# Patient Record
Sex: FEMALE | Race: WHITE | NOT HISPANIC OR LATINO | Employment: UNEMPLOYED | URBAN - METROPOLITAN AREA
[De-identification: names, ages, dates, MRNs, and addresses within clinical notes are randomized per-mention and may not be internally consistent; named-entity substitution may affect disease eponyms.]

---

## 2019-08-12 ENCOUNTER — OFFICE VISIT (OUTPATIENT)
Dept: ORTHOPEDICS | Facility: CLINIC | Age: 64
End: 2019-08-12
Payer: MEDICAID

## 2019-08-12 VITALS — WEIGHT: 173.88 LBS | BODY MASS INDEX: 30.81 KG/M2 | HEIGHT: 63 IN | TEMPERATURE: 98 F

## 2019-08-12 DIAGNOSIS — M17.0 PRIMARY OSTEOARTHRITIS OF BOTH KNEES: Primary | ICD-10-CM

## 2019-08-12 PROCEDURE — 99201 PR OFFICE/OUTPT VISIT,NEW,LEVL I: ICD-10-PCS | Mod: 25,S$GLB,, | Performed by: ORTHOPAEDIC SURGERY

## 2019-08-12 PROCEDURE — 99201 PR OFFICE/OUTPT VISIT,NEW,LEVL I: CPT | Mod: 25,S$GLB,, | Performed by: ORTHOPAEDIC SURGERY

## 2019-08-12 PROCEDURE — 20610 LARGE JOINT ASPIRATION/INJECTION: R KNEE, L KNEE: ICD-10-PCS | Mod: 50,S$GLB,, | Performed by: ORTHOPAEDIC SURGERY

## 2019-08-12 PROCEDURE — 20610 DRAIN/INJ JOINT/BURSA W/O US: CPT | Mod: 50,S$GLB,, | Performed by: ORTHOPAEDIC SURGERY

## 2019-08-12 RX ORDER — ATENOLOL 25 MG/1
12.5 TABLET ORAL 2 TIMES DAILY
Refills: 0 | COMMUNITY
Start: 2019-07-11 | End: 2021-01-14

## 2019-08-12 RX ORDER — FLUTICASONE PROPIONATE 50 MCG
SPRAY, SUSPENSION (ML) NASAL
Refills: 3 | COMMUNITY
Start: 2019-07-11 | End: 2021-10-07

## 2019-08-12 RX ORDER — ALBUTEROL SULFATE 90 UG/1
AEROSOL, METERED RESPIRATORY (INHALATION)
Refills: 1 | COMMUNITY
Start: 2019-07-11 | End: 2019-11-19 | Stop reason: CLARIF

## 2019-08-12 RX ORDER — BUPROPION HYDROCHLORIDE 150 MG/1
150 TABLET, EXTENDED RELEASE ORAL
COMMUNITY
Start: 2016-02-22 | End: 2019-11-19 | Stop reason: CLARIF

## 2019-08-12 RX ORDER — DICLOFENAC SODIUM 75 MG/1
TABLET, DELAYED RELEASE ORAL
Refills: 0 | Status: ON HOLD | COMMUNITY
Start: 2019-08-01 | End: 2019-12-05 | Stop reason: HOSPADM

## 2019-08-12 RX ORDER — TRIAMCINOLONE ACETONIDE 40 MG/ML
40 INJECTION, SUSPENSION INTRA-ARTICULAR; INTRAMUSCULAR
Status: DISCONTINUED | OUTPATIENT
Start: 2019-08-12 | End: 2019-08-12 | Stop reason: HOSPADM

## 2019-08-12 RX ORDER — TRAMADOL HYDROCHLORIDE 50 MG/1
TABLET ORAL
Refills: 0 | Status: ON HOLD | COMMUNITY
Start: 2019-06-16 | End: 2019-12-05 | Stop reason: HOSPADM

## 2019-08-12 RX ORDER — MONTELUKAST SODIUM 10 MG/1
10 TABLET ORAL DAILY
Refills: 3 | COMMUNITY
Start: 2019-07-11 | End: 2021-01-14

## 2019-08-12 RX ORDER — FENOFIBRATE 134 MG/1
CAPSULE ORAL
Refills: 1 | COMMUNITY
Start: 2019-07-19 | End: 2021-10-07

## 2019-08-12 RX ORDER — PREDNISONE 20 MG/1
TABLET ORAL
Refills: 0 | Status: ON HOLD | COMMUNITY
Start: 2019-06-16 | End: 2019-12-05 | Stop reason: HOSPADM

## 2019-08-12 RX ORDER — MELOXICAM 7.5 MG/1
TABLET ORAL
Refills: 0 | Status: ON HOLD | COMMUNITY
Start: 2019-08-02 | End: 2019-12-05 | Stop reason: HOSPADM

## 2019-08-12 RX ORDER — OMEPRAZOLE 20 MG/1
CAPSULE, DELAYED RELEASE ORAL
Refills: 4 | COMMUNITY
Start: 2019-07-19 | End: 2021-10-07

## 2019-08-12 RX ORDER — GABAPENTIN 100 MG/1
100 CAPSULE ORAL NIGHTLY
Refills: 1 | Status: ON HOLD | COMMUNITY
Start: 2019-07-11 | End: 2019-12-05 | Stop reason: HOSPADM

## 2019-08-12 RX ORDER — CETIRIZINE HYDROCHLORIDE 10 MG/1
TABLET ORAL
Refills: 5 | COMMUNITY
Start: 2019-07-19 | End: 2021-01-14

## 2019-08-12 RX ORDER — NABUMETONE 750 MG/1
TABLET, FILM COATED ORAL
Refills: 0 | Status: ON HOLD | COMMUNITY
Start: 2019-05-26 | End: 2019-12-05 | Stop reason: HOSPADM

## 2019-08-12 RX ORDER — AZELASTINE HCL 205.5 UG/1
1 SPRAY NASAL
COMMUNITY
Start: 2018-01-02 | End: 2019-11-19 | Stop reason: CLARIF

## 2019-08-12 RX ORDER — HYDROCHLOROTHIAZIDE 25 MG/1
TABLET ORAL
Refills: 3 | COMMUNITY
Start: 2019-07-11 | End: 2021-01-14

## 2019-08-12 RX ORDER — BUSPIRONE HYDROCHLORIDE 15 MG/1
TABLET ORAL
Refills: 6 | COMMUNITY
Start: 2019-07-11 | End: 2019-11-19 | Stop reason: CLARIF

## 2019-08-12 RX ORDER — LEVOTHYROXINE SODIUM 50 UG/1
50 TABLET ORAL DAILY
Refills: 6 | COMMUNITY
Start: 2019-07-11 | End: 2021-10-07

## 2019-08-12 RX ORDER — HYDROCODONE BITARTRATE AND ACETAMINOPHEN 5; 325 MG/1; MG/1
TABLET ORAL
Refills: 0 | Status: ON HOLD | COMMUNITY
Start: 2019-08-02 | End: 2019-12-05 | Stop reason: HOSPADM

## 2019-08-12 RX ADMIN — TRIAMCINOLONE ACETONIDE 40 MG: 40 INJECTION, SUSPENSION INTRA-ARTICULAR; INTRAMUSCULAR at 01:08

## 2019-08-12 NOTE — PROCEDURES
Large Joint Aspiration/Injection: R knee, L knee  Date/Time: 8/12/2019 1:33 PM  Performed by: Nimesh Bello MD  Authorized by: Nimesh Bello MD     Consent Done?:  Yes (Verbal)  Indications:  Pain  Timeout: Prior to procedure the correct patient, procedure, and site was verified    Anesthesia  Local anesthesia used  Anesthetic: lidocaine 2% without epinephrine  Anesthetic total: 4mL    Location:  Knee  Site:  R knee and L knee  Prep: Patient was prepped and draped in usual sterile fashion    Needle size:  25 G  Approach:  Anteromedial  Medications:  40 mg triamcinolone acetonide 40 mg/mL; 40 mg triamcinolone acetonide 40 mg/mL  Patient tolerance:  Patient tolerated the procedure well with no immediate complications

## 2019-08-12 NOTE — PROGRESS NOTES
Subjective:      Patient ID: Lila Antonio is a 63 y.o. female.    Chief Complaint: Knee Pain (LT)    HPI 63-year-old lady with a many year history of bilateral knee pain worse on the left than the right.  She recently moved from [place] to [place].  She was getting injections in [place] with some relief.  She is presently taking diclofenac for her arthritis.    Review of Systems   Constitution: Negative for fever and weight loss.   Cardiovascular: Negative for chest pain and leg swelling.   Musculoskeletal: Positive for arthritis, joint pain, joint swelling and stiffness. Negative for muscle weakness.   Gastrointestinal: Negative for change in bowel habit.   Genitourinary: Negative for bladder incontinence and hematuria.   Neurological: Negative for focal weakness, numbness, paresthesias and sensory change.         Objective:                      Right Knee Exam     Inspection   Deformity: present    Tenderness   The patient is tender to palpation of the medial joint line, condyle and lateral joint line.    Crepitus   The patient has crepitus of the patella.    Range of Motion   Extension: normal   Flexion: 120     Tests   Meniscus   Dax:  Medial - negative   Ligament Examination   MCL - Valgus: normal (0 to 2mm)  LCL - Varus: normal  Patella   Patellar Tracking: normal    Comments:  Patient has significant varus alignment on standing    Left Knee Exam     Inspection   Deformity: present    Tenderness   The patient tender to palpation of the medial joint line, condyle and lateral joint line.    Crepitus   The patient has crepitus of the patella.    Range of Motion   Extension: normal   Flexion: 120     Tests   Meniscus   Dax:  Medial - negative   Stability Lachman: normal (-1 to 2mm)   MCL - Valgus: normal (0 to 2mm)  LCL - Varus: normal (0 to 2mm)  Patella   Patellar Tracking: normal    Comments:  Significant varus alignment on standing              Assessment:       Encounter Diagnosis   Name Primary?     Primary osteoarthritis of both knees Yes          Plan:       Lila was seen today for knee pain.    Diagnoses and all orders for this visit:    Primary osteoarthritis of both knees     Outside x-rays are reviewed and they show severe tricompartmental disease with complete loss of the medial articular cartilage space with bone-on-bone deformity.  The medial femoral condyle is eroding into the medial tibial plateau worse on the left than the right.  She also has severe patellofemoral disease  She really needs total knee arthroplasties although she is unable to do that at this time.  Both knees are injected today. Return p.r.n.

## 2019-08-29 ENCOUNTER — OFFICE VISIT (OUTPATIENT)
Dept: ORTHOPEDICS | Facility: CLINIC | Age: 64
End: 2019-08-29
Payer: MEDICAID

## 2019-08-29 DIAGNOSIS — M17.0 PRIMARY OSTEOARTHRITIS OF BOTH KNEES: Primary | ICD-10-CM

## 2019-08-29 PROCEDURE — 20610 DRAIN/INJ JOINT/BURSA W/O US: CPT | Mod: LT,S$GLB,, | Performed by: ORTHOPAEDIC SURGERY

## 2019-08-29 PROCEDURE — 20610 LARGE JOINT ASPIRATION/INJECTION: L KNEE: ICD-10-PCS | Mod: LT,S$GLB,, | Performed by: ORTHOPAEDIC SURGERY

## 2019-08-29 PROCEDURE — 99212 OFFICE O/P EST SF 10 MIN: CPT | Mod: 25,S$GLB,, | Performed by: ORTHOPAEDIC SURGERY

## 2019-08-29 PROCEDURE — 99212 PR OFFICE/OUTPT VISIT, EST, LEVL II, 10-19 MIN: ICD-10-PCS | Mod: 25,S$GLB,, | Performed by: ORTHOPAEDIC SURGERY

## 2019-08-29 RX ORDER — VALACYCLOVIR HYDROCHLORIDE 1 G/1
TABLET, FILM COATED ORAL
Refills: 1 | COMMUNITY
Start: 2019-08-17 | End: 2021-01-14

## 2019-08-29 RX ORDER — METHYLPREDNISOLONE 4 MG/1
TABLET ORAL
Refills: 0 | Status: ON HOLD | COMMUNITY
Start: 2019-08-18 | End: 2019-12-05 | Stop reason: HOSPADM

## 2019-08-29 RX ORDER — CEFDINIR 300 MG/1
CAPSULE ORAL
Refills: 0 | Status: ON HOLD | COMMUNITY
Start: 2019-08-18 | End: 2019-12-05 | Stop reason: HOSPADM

## 2019-08-29 NOTE — PROGRESS NOTES
Subjective:      Patient ID: Lila Antonio is a 63 y.o. female.    Chief Complaint: Knee Pain (LT)    HPI patient comes in with a large effusion in the left knee.  She has known end-stage osteoarthritis in the knee    ROS unchanged from prior visit      Objective:      Active and passive range of motion is normal. She has a moderate-sized effusion.  She is tender at both the medial and lateral joint line. She has no pathologic laxity      Ortho/SPM Exam            Assessment:       Encounter Diagnosis   Name Primary?    Primary osteoarthritis of both knees Yes          Plan:       Lila was seen today for knee pain.    Diagnoses and all orders for this visit:    Primary osteoarthritis of both knees    Large Joint Aspiration/Injection: L knee  Date/Time: 8/29/2019 10:09 AM  Performed by: Nimesh Bello MD  Authorized by: Nimesh Bello MD     Consent Done?:  Yes (Verbal)  Indications:  Pain and joint swelling  Timeout: Prior to procedure the correct patient, procedure, and site was verified    Anesthesia  Local anesthesia used  Anesthesia: local infiltration  Anesthetic: lidocaine 1% without epinephrine  Anesthetic total: 2mL    Location:  Knee  Site:  L knee  Prep: Patient was prepped and draped in usual sterile fashion    Needle size:  25 G  Approach:  Lateral  Medications:  10 mg triamcinolone acetonide 10 mg/mL  Aspirate amount (ml):  32  Aspirate:  Clear and yellow  Patient tolerance:  Patient tolerated the procedure well with no immediate complications

## 2019-12-03 PROBLEM — E55.9 VITAMIN D INSUFFICIENCY: Status: ACTIVE | Noted: 2018-08-23

## 2019-12-03 PROBLEM — I20.9 AP (ANGINA PECTORIS): Status: RESOLVED | Noted: 2019-11-13 | Resolved: 2019-12-03

## 2019-12-03 PROBLEM — I10 ESSENTIAL HYPERTENSION: Status: ACTIVE | Noted: 2018-01-23

## 2019-12-03 PROBLEM — R94.39 ABNORMAL RESULT OF OTHER CARDIOVASCULAR FUNCTION STUDY: Status: ACTIVE | Noted: 2018-03-19

## 2019-12-03 PROBLEM — Z87.891 PERSONAL HISTORY OF NICOTINE DEPENDENCE: Status: ACTIVE | Noted: 2017-10-16

## 2019-12-03 PROBLEM — E55.9 VITAMIN D INSUFFICIENCY: Status: RESOLVED | Noted: 2018-08-23 | Resolved: 2019-12-03

## 2019-12-03 PROBLEM — Z98.890 STATUS POST SURGERY: Status: ACTIVE | Noted: 2019-12-03

## 2019-12-03 PROBLEM — M17.12 PRIMARY OSTEOARTHRITIS OF ONE KNEE, LEFT: Status: ACTIVE | Noted: 2019-12-03

## 2019-12-03 PROBLEM — M17.12 UNILATERAL PRIMARY OSTEOARTHRITIS, LEFT KNEE: Status: ACTIVE | Noted: 2019-12-03

## 2019-12-03 PROBLEM — Z01.818 PRE-OP TESTING: Status: ACTIVE | Noted: 2019-11-08

## 2019-12-03 PROBLEM — B00.1 HERPESVIRAL VESICULAR DERMATITIS: Status: RESOLVED | Noted: 2017-10-09 | Resolved: 2019-12-03

## 2019-12-03 PROBLEM — M25.561 CHRONIC PAIN OF RIGHT KNEE: Status: ACTIVE | Noted: 2019-12-03

## 2019-12-03 PROBLEM — Z01.818 PRE-OP TESTING: Status: RESOLVED | Noted: 2019-11-08 | Resolved: 2019-12-03

## 2019-12-03 PROBLEM — G89.29 CHRONIC PAIN OF RIGHT KNEE: Status: ACTIVE | Noted: 2019-12-03

## 2019-12-03 PROBLEM — Z86.79 HISTORY OF ATRIAL FIBRILLATION WITHOUT CURRENT MEDICATION: Status: ACTIVE | Noted: 2018-01-23

## 2019-12-03 PROBLEM — M25.562 CHRONIC PAIN OF LEFT KNEE: Status: ACTIVE | Noted: 2019-12-03

## 2019-12-03 PROBLEM — E78.5 HYPERLIPIDEMIA: Status: ACTIVE | Noted: 2018-01-23

## 2019-12-03 PROBLEM — M17.11 PRIMARY OSTEOARTHRITIS OF RIGHT KNEE: Status: ACTIVE | Noted: 2019-12-03

## 2019-12-03 PROBLEM — H69.93 EUSTACHIAN TUBE DYSFUNCTION, BILATERAL: Status: RESOLVED | Noted: 2018-06-05 | Resolved: 2019-12-03

## 2019-12-03 PROBLEM — B00.1 HERPESVIRAL VESICULAR DERMATITIS: Status: ACTIVE | Noted: 2017-10-09

## 2019-12-03 PROBLEM — I20.9 AP (ANGINA PECTORIS): Status: ACTIVE | Noted: 2019-11-13

## 2019-12-03 PROBLEM — R74.8 ELEVATED LIVER ENZYMES: Status: ACTIVE | Noted: 2018-04-02

## 2019-12-03 PROBLEM — A69.20 LYME DISEASE: Status: ACTIVE | Noted: 2019-12-03

## 2019-12-03 PROBLEM — R74.8 ELEVATED LIVER ENZYMES: Status: RESOLVED | Noted: 2018-04-02 | Resolved: 2019-12-03

## 2019-12-03 PROBLEM — G89.29 CHRONIC PAIN OF LEFT KNEE: Status: ACTIVE | Noted: 2019-12-03

## 2019-12-03 PROBLEM — E60 ZINC DEFICIENCY: Status: ACTIVE | Noted: 2018-08-23

## 2019-12-03 PROBLEM — H69.93 EUSTACHIAN TUBE DYSFUNCTION, BILATERAL: Status: ACTIVE | Noted: 2018-06-05

## 2019-12-03 PROBLEM — R94.39 ABNORMAL RESULT OF OTHER CARDIOVASCULAR FUNCTION STUDY: Status: RESOLVED | Noted: 2018-03-19 | Resolved: 2019-12-03

## 2019-12-03 PROBLEM — M17.9 OSTEOARTHRITIS, KNEE: Status: ACTIVE | Noted: 2019-12-03

## 2021-10-07 PROBLEM — I47.10 PAROXYSMAL SVT (SUPRAVENTRICULAR TACHYCARDIA): Status: ACTIVE | Noted: 2021-04-29

## 2021-10-07 PROBLEM — Z98.890 S/P ABLATION OPERATION FOR ARRHYTHMIA: Status: ACTIVE | Noted: 2021-04-30

## 2021-10-07 PROBLEM — Z86.39 HISTORY OF VITAMIN D DEFICIENCY: Status: ACTIVE | Noted: 2018-08-23

## 2021-10-07 PROBLEM — Z86.79 S/P ABLATION OPERATION FOR ARRHYTHMIA: Status: ACTIVE | Noted: 2021-04-30
